# Patient Record
Sex: FEMALE | Race: WHITE | Employment: OTHER | URBAN - METROPOLITAN AREA
[De-identification: names, ages, dates, MRNs, and addresses within clinical notes are randomized per-mention and may not be internally consistent; named-entity substitution may affect disease eponyms.]

---

## 2024-10-18 ENCOUNTER — OFFICE VISIT (OUTPATIENT)
Dept: CARDIOLOGY CLINIC | Age: 74
End: 2024-10-18

## 2024-10-18 VITALS
DIASTOLIC BLOOD PRESSURE: 82 MMHG | HEIGHT: 66 IN | SYSTOLIC BLOOD PRESSURE: 134 MMHG | WEIGHT: 158 LBS | OXYGEN SATURATION: 98 % | HEART RATE: 76 BPM | BODY MASS INDEX: 25.39 KG/M2

## 2024-10-18 DIAGNOSIS — I10 ESSENTIAL HYPERTENSION: ICD-10-CM

## 2024-10-18 DIAGNOSIS — I48.0 PAF (PAROXYSMAL ATRIAL FIBRILLATION) (HCC): Primary | ICD-10-CM

## 2024-10-18 NOTE — PROGRESS NOTES
Electrophysiology FU Note        Chief complaint :  here for discuss about watchman and anemia, anticoagulation.     Referring physician:       Primary care physician: Monica Schmidt      History of Present Illness:     This visit (10/18/2024)    Patient here to discuss about watchman  Patient is back on xarelto  She is taking her medications regularly  Patient worried about anticoagulation and bleeding risk but at the same time worried about procedure risk  Patient feels tiredness  Patient denies syncope or dizziness. Denies shortness of breath        Previous visit:  (8/23/2024)    Patient here to discuss about watchman  Patient reports that she had anemia severe recently and she is getting work up from GI  She is not taking her xarelto any more and has appt with  on Monday  Here to discuss options for her anticoagulation  Denies chest pain, shortness of breath, palpitations, syncope or presyncope, edema,  Feels tired though    Previous visit:  (5/5/2023)      Chief Complaint   Patient presents with    6 Month Follow-Up     Pt here for 6 month f/u. Pt denies Chest Pains, Palpitations, SOB, Dizziness and Edema. Patient does not smoke or drink alcohol. Pt drinks 1 cup of coffee in the mornings.    She has lot of stress going on - its from her .   She has bright blood in stool at times but not now  She does not want colonoscopy and has plan for seeing gastroenterologist.         Previous visit: (10/21/2022)      Chief Complaint   Patient presents with    Hyperlipidemia    Hypertension     Denies any new onset cardiac complaints     Patient feels better than before.  But still having a lot of stress from her .  Patient recently had her tooth filled and she had some swelling around the tooth area      Previous visit: (3/16/2022)      Chief Complaint   Patient presents with    6 Month Follow-Up     Dr. Sue Patient here for 6 month f/u,

## 2024-11-06 ENCOUNTER — HOSPITAL ENCOUNTER (OUTPATIENT)
Dept: INFUSION THERAPY | Age: 74
Discharge: HOME | End: 2024-11-06
Payer: COMMERCIAL

## 2024-11-06 ENCOUNTER — OFFICE VISIT (OUTPATIENT)
Dept: ONCOLOGY | Age: 74
End: 2024-11-06
Payer: COMMERCIAL

## 2024-11-06 VITALS
BODY MASS INDEX: 24.75 KG/M2 | HEIGHT: 66 IN | WEIGHT: 154 LBS | OXYGEN SATURATION: 96 % | TEMPERATURE: 98 F | SYSTOLIC BLOOD PRESSURE: 136 MMHG | DIASTOLIC BLOOD PRESSURE: 62 MMHG | HEART RATE: 67 BPM

## 2024-11-06 DIAGNOSIS — K90.9 INTESTINAL MALABSORPTION, UNSPECIFIED TYPE: ICD-10-CM

## 2024-11-06 DIAGNOSIS — D50.0 IRON DEFICIENCY ANEMIA SECONDARY TO BLOOD LOSS (CHRONIC): Primary | ICD-10-CM

## 2024-11-06 PROCEDURE — 1159F MED LIST DOCD IN RCRD: CPT | Performed by: INTERNAL MEDICINE

## 2024-11-06 PROCEDURE — 99211 OFF/OP EST MAY X REQ PHY/QHP: CPT

## 2024-11-06 PROCEDURE — 1126F AMNT PAIN NOTED NONE PRSNT: CPT | Performed by: INTERNAL MEDICINE

## 2024-11-06 PROCEDURE — 3078F DIAST BP <80 MM HG: CPT | Performed by: INTERNAL MEDICINE

## 2024-11-06 PROCEDURE — 99214 OFFICE O/P EST MOD 30 MIN: CPT | Performed by: INTERNAL MEDICINE

## 2024-11-06 PROCEDURE — 1123F ACP DISCUSS/DSCN MKR DOCD: CPT | Performed by: INTERNAL MEDICINE

## 2024-11-06 PROCEDURE — 3075F SYST BP GE 130 - 139MM HG: CPT | Performed by: INTERNAL MEDICINE

## 2024-11-06 ASSESSMENT — PATIENT HEALTH QUESTIONNAIRE - PHQ9
SUM OF ALL RESPONSES TO PHQ QUESTIONS 1-9: 0
1. LITTLE INTEREST OR PLEASURE IN DOING THINGS: NOT AT ALL
2. FEELING DOWN, DEPRESSED OR HOPELESS: NOT AT ALL
SUM OF ALL RESPONSES TO PHQ9 QUESTIONS 1 & 2: 0

## 2024-11-06 NOTE — PROGRESS NOTES
MA Rooming Questions  Patient: Melodie Doll  MRN: [id number]    Date: 11/6/2024        1. Do you have any new issues?   no         2. Do you need any refills on medications?    no    3. Have you had any imaging done since your last visit?   yes - labs 10/9    4. Have you been hospitalized or seen in the emergency room since your last visit here?   yes - 8/26-8/28    5. Did the patient have a depression screening completed today? Yes    PHQ-9 Total Score: 0 (11/6/2024  1:07 PM)       PHQ-9 Given to (if applicable):               PHQ-9 Score (if applicable):                     [] Positive     []  Negative              Does question #9 need addressed (if applicable)                     [] Yes    []  No               Shruthi Cramer CMA      
headache, No hiccups, No abnormal gait, No sensory changes, No weakness.  Hematologic:  No epistaxis, No gingival bleeding, No petechiae, No ecchymosis.  Lymphatic:  No lymphadenopathy, No lymphedema.  Allergy / Immunologic:  No eczema, No frequent mucous infections, No frequent respiratory infections, No recurrent urticarial, No frequent skin infections.     Vital Signs: /62 (Site: Left Upper Arm, Position: Sitting, Cuff Size: Medium Adult)   Pulse 67   Temp 98 °F (36.7 °C) (Temporal)   Ht 1.676 m (5' 6\")   Wt 69.9 kg (154 lb)   LMP  (LMP Unknown)   SpO2 96%   BMI 24.86 kg/m²      CONSTITUTIONAL: awake, alert, cooperative, no apparent distress , ambulating with cane   EYES: CATHERINE pallor but no icterus  ENT: ATNC  NECK: No JVD  HEMATOLOGIC/LYMPHATIC: no cervical, supraclavicular or axillary lymphadenopathy   LUNGS: CTAB  CARDIOVASCULAR: s1s2 rrr no murmurs  ABDOMEN: soft ntnd bs pos  MUSCULOSKELETAL: full range of motion noted, tone is normal  NEUROLOGIC: GI  SKIN: No rash  EXTREMITIES: no LE edema bilaterally     Labs:  Hematology:  Lab Results   Component Value Date    WBC 5.6 08/27/2024    RBC 3.23 (L) 08/27/2024    HGB 9.7 (L) 08/27/2024    HCT 31.1 (L) 08/27/2024    MCV 96.3 08/27/2024    MCH 30.0 08/27/2024    MCHC 31.2 (L) 08/27/2024    RDW 15.6 (H) 08/27/2024     08/27/2024    MPV 9.3 08/27/2024    BASOPCT 0.5 08/27/2024    LYMPHOPCT 33.5 08/27/2024    MONOPCT 11.6 (H) 08/27/2024    EOSABS 0.2 08/27/2024    BASOSABS 0.0 08/27/2024    LYMPHSABS 1.9 08/27/2024    MONOSABS 0.7 08/27/2024    DIFFTYPE AUTOMATED DIFFERENTIAL 08/27/2024     No results found for: \"ESR\"  Chemistry:  Lab Results   Component Value Date     08/27/2024    K 4.2 08/27/2024     08/27/2024    CO2 26 08/27/2024    BUN 15 08/27/2024    CREATININE 0.7 08/27/2024    GLUCOSE 140 (H) 08/27/2024    CALCIUM 8.9 08/27/2024    BILITOT 0.2 08/26/2024    ALKPHOS 48 08/26/2024    AST 34 08/26/2024    ALT 24 08/26/2024

## 2024-12-04 ENCOUNTER — TRANSCRIBE ORDERS (OUTPATIENT)
Dept: ADMINISTRATIVE | Age: 74
End: 2024-12-04

## 2024-12-04 DIAGNOSIS — R40.4 ALTERATION OF CONSCIOUSNESS: Primary | ICD-10-CM

## 2024-12-06 ENCOUNTER — TRANSCRIBE ORDERS (OUTPATIENT)
Dept: ADMINISTRATIVE | Age: 74
End: 2024-12-06

## 2024-12-06 DIAGNOSIS — K76.0 FATTY LIVER: Primary | ICD-10-CM

## 2024-12-12 ENCOUNTER — APPOINTMENT (OUTPATIENT)
Dept: MRI IMAGING | Age: 74
End: 2024-12-12
Attending: FAMILY MEDICINE
Payer: COMMERCIAL

## 2024-12-12 ENCOUNTER — HOSPITAL ENCOUNTER (OUTPATIENT)
Dept: MRI IMAGING | Age: 74
Discharge: HOME | End: 2024-12-12
Attending: FAMILY MEDICINE
Payer: COMMERCIAL

## 2024-12-12 DIAGNOSIS — R40.4 ALTERATION OF CONSCIOUSNESS: ICD-10-CM

## 2024-12-12 PROCEDURE — 70551 MRI BRAIN STEM W/O DYE: CPT

## 2024-12-16 ENCOUNTER — HOSPITAL ENCOUNTER (OUTPATIENT)
Dept: ULTRASOUND IMAGING | Age: 74
Discharge: HOME | End: 2024-12-16
Attending: FAMILY MEDICINE
Payer: COMMERCIAL

## 2024-12-16 ENCOUNTER — TELEPHONE (OUTPATIENT)
Dept: CARDIOLOGY CLINIC | Age: 74
End: 2024-12-16

## 2024-12-16 DIAGNOSIS — K76.0 FATTY LIVER: ICD-10-CM

## 2024-12-16 PROCEDURE — 76705 ECHO EXAM OF ABDOMEN: CPT

## 2024-12-16 NOTE — TELEPHONE ENCOUNTER
Pt came to ask about med refill placed on 12/13 and that she only has a week of meds left and would like to be contacted when order is placed.

## 2025-02-24 ENCOUNTER — TELEPHONE (OUTPATIENT)
Dept: CARDIOLOGY CLINIC | Age: 75
End: 2025-02-24

## 2025-02-24 NOTE — TELEPHONE ENCOUNTER
Pt said her insurance is saying they don't cover xarelto and she doesn't know what to do. It is to expensive for them to cover

## 2025-02-26 NOTE — TELEPHONE ENCOUNTER
Patient called the cost has gone  up to $409   90 day for her Xarelto she only has a few days   Left and needs a call back. Second call .

## 2025-02-27 NOTE — PROGRESS NOTES
Patient at  stating she can not afford her Xarelto and wanted to know if she could go on Eliquis to get samples. Per Delmy lin to switch, Eliquis RX called into pharmacy and samples given.

## 2025-03-03 ENCOUNTER — HOSPITAL ENCOUNTER (OUTPATIENT)
Dept: INFUSION THERAPY | Age: 75
Discharge: HOME | End: 2025-03-03
Payer: COMMERCIAL

## 2025-03-03 ENCOUNTER — OFFICE VISIT (OUTPATIENT)
Dept: ONCOLOGY | Age: 75
End: 2025-03-03
Payer: COMMERCIAL

## 2025-03-03 VITALS
OXYGEN SATURATION: 99 % | HEIGHT: 66 IN | SYSTOLIC BLOOD PRESSURE: 156 MMHG | WEIGHT: 154.2 LBS | DIASTOLIC BLOOD PRESSURE: 96 MMHG | BODY MASS INDEX: 24.78 KG/M2 | TEMPERATURE: 98.6 F | HEART RATE: 66 BPM

## 2025-03-03 DIAGNOSIS — D50.0 IRON DEFICIENCY ANEMIA SECONDARY TO BLOOD LOSS (CHRONIC): Primary | ICD-10-CM

## 2025-03-03 PROCEDURE — 3080F DIAST BP >= 90 MM HG: CPT | Performed by: INTERNAL MEDICINE

## 2025-03-03 PROCEDURE — 99213 OFFICE O/P EST LOW 20 MIN: CPT | Performed by: INTERNAL MEDICINE

## 2025-03-03 PROCEDURE — 1123F ACP DISCUSS/DSCN MKR DOCD: CPT | Performed by: INTERNAL MEDICINE

## 2025-03-03 PROCEDURE — 1159F MED LIST DOCD IN RCRD: CPT | Performed by: INTERNAL MEDICINE

## 2025-03-03 PROCEDURE — 3077F SYST BP >= 140 MM HG: CPT | Performed by: INTERNAL MEDICINE

## 2025-03-03 PROCEDURE — 99212 OFFICE O/P EST SF 10 MIN: CPT

## 2025-03-03 PROCEDURE — 1126F AMNT PAIN NOTED NONE PRSNT: CPT | Performed by: INTERNAL MEDICINE

## 2025-03-03 NOTE — PROGRESS NOTES
MA Rooming Questions  Patient: Melodie Madden  MRN: [id number]    Date: 3/3/2025        1. Do you have any new issues?   no         2. Do you need any refills on medications?    no    3. Have you had any imaging done since your last visit?   yes - [facility] abdomen, mri brain with [facility]    4. Have you been hospitalized or seen in the emergency room since your last visit here?   no    5. Did the patient have a depression screening completed today? No    No data recorded     PHQ-9 Given to (if applicable):               PHQ-9 Score (if applicable):                     [] Positive     []  Negative              Does question #9 need addressed (if applicable)                     [] Yes    []  No               Billie Moffett MA      
follow-up with primary care physician and other specialists.    Please do not hesitate to contact us if you need further information.    Return to clinic 6m  or earlier if new Sx    This note is created with the assistance of a speech-recognition program. While intending to generate a document that accurately reflects the content of the visit, no guarantee can be provided that every mistake has been identified and corrected by editing.    Portions of this note are copied forward from previous clinic note.  Interval history, ROS, physical exam, assessment and plan has been reviewed and updated for accuracy by this provider.    Time Spent with patient,  for face to face, exam, education, discussing treatment options, reviewing imaging, reviewing labs, decision making, Pre-charting, counseling and documenting today's visit, 25   mins.     REZA CONNOR, Deanna Uriostegui, am scribing for and in the presence of Wendi Tena MD. 3/3/25/4:00 AM EST

## 2025-03-17 ENCOUNTER — TELEPHONE (OUTPATIENT)
Dept: CARDIOLOGY CLINIC | Age: 75
End: 2025-03-17

## 2025-03-17 NOTE — TELEPHONE ENCOUNTER
Spoke with Patient and she stated her insurance and no longer covering Xarelto and would like to switch to Eliquis. I scheduled office visit with Delmy Campbell NP on 4/8/25 @ 1045am.    Patient advised understanding.

## 2025-03-24 ENCOUNTER — TELEPHONE (OUTPATIENT)
Dept: CARDIOLOGY CLINIC | Age: 75
End: 2025-03-24

## 2025-03-24 ENCOUNTER — OFFICE VISIT (OUTPATIENT)
Dept: CARDIOLOGY CLINIC | Age: 75
End: 2025-03-24
Payer: COMMERCIAL

## 2025-03-24 DIAGNOSIS — I63.423 CEREBROVASCULAR ACCIDENT (CVA) DUE TO BILATERAL OCCLUSION OF ANTERIOR CEREBRAL ARTERIES (HCC): ICD-10-CM

## 2025-03-24 DIAGNOSIS — I10 ESSENTIAL HYPERTENSION: ICD-10-CM

## 2025-03-24 DIAGNOSIS — D68.69 HYPERCOAGULABLE STATE DUE TO PAROXYSMAL ATRIAL FIBRILLATION (HCC): ICD-10-CM

## 2025-03-24 DIAGNOSIS — I48.0 PAF (PAROXYSMAL ATRIAL FIBRILLATION) (HCC): ICD-10-CM

## 2025-03-24 DIAGNOSIS — R42 DIZZINESS: Primary | ICD-10-CM

## 2025-03-24 DIAGNOSIS — R01.1 MURMUR: ICD-10-CM

## 2025-03-24 DIAGNOSIS — I48.0 HYPERCOAGULABLE STATE DUE TO PAROXYSMAL ATRIAL FIBRILLATION (HCC): ICD-10-CM

## 2025-03-24 PROCEDURE — 99204 OFFICE O/P NEW MOD 45 MIN: CPT | Performed by: INTERNAL MEDICINE

## 2025-03-24 PROCEDURE — 93000 ELECTROCARDIOGRAM COMPLETE: CPT | Performed by: INTERNAL MEDICINE

## 2025-03-24 NOTE — TELEPHONE ENCOUNTER
Patient came to office because she was lightheaded and felt like she was going to pass out.  Patient was brought back into room. Dr Yeung agreed to see patient

## 2025-03-24 NOTE — PROGRESS NOTES
CARDIOLOGY CONSULT NOTE   Reason for consultation:  DIZZINESS    Referring physician:  No admitting provider for patient encounter.     Primary care physician: Monica Schmidt      Dear   Thanks for the consult.    History of present illness:Melodie is a 74 y.o.year old who  presents with Patient feels dizziness AND almost fainting when she stood up at the front counter to get samples of eliquis ( switching from xarelto) its intermittent,lasts for few seconds, for last 2-3 weeks, every other day, its associated with palpitations,it gets better with sitting with sudden movement,pateint is not sure if its from head movement or arm movement  She had iron infusion for low hemoglobin count, she may get watchman devie  Blood pressure, cholesterol, blood glucose and weight are well controlled.    Past medical history:    has a past medical history of Cerebral artery occlusion with cerebral infarction (HCC), Diabetes mellitus (HCC), History of transesophageal echocardiography (LARY), Hyperlipidemia, and Hypertension.  Past surgical history:   has a past surgical history that includes Anterior cruciate ligament repair; Eye surgery; Appendectomy; Cholecystectomy; knee surgery; Ovary removal; and Dilation and curettage of uterus.  Social History:   reports that she quit smoking about 14 years ago. Her smoking use included cigarettes. She has never used smokeless tobacco.  Family history:   no family history of CAD, STROKE of DM    Allergies   Allergen Reactions    Amlodipine      Upset stomach    Aspirin     Meperidine      hives    Statins      Patient reports that she had a couple statins and they caused swelling in her tongue.  She ended up in the emergency department, but was not admitted.  She was given steroids on one occasion and Benadryl on another occasion.    Sulfa Antibiotics     Tetanus Immune Globulin     Gadolinium Derivatives Hives and Itching     Pt had hive on neck and back and itching stomach neck and back

## 2025-03-26 NOTE — TELEPHONE ENCOUNTER
Pt stating that [facility] said the Elquis $140 instead of 30day trial. Would like to talk to someone about it.

## 2025-03-31 ENCOUNTER — TELEPHONE (OUTPATIENT)
Dept: CARDIOLOGY CLINIC | Age: 75
End: 2025-03-31

## 2025-03-31 ENCOUNTER — TELEPHONE (OUTPATIENT)
Dept: ONCOLOGY | Age: 75
End: 2025-03-31

## 2025-03-31 NOTE — TELEPHONE ENCOUNTER
Pt called in and was very confused about appointments. She states something \"short circuited\" in her brain in the past couple of days and she cannot remember anything. She was inquiring about her \"future\" appt on 3/3/25. I informed her that 3/3/25 was a past date and she already had this appt. I asked her if she knew what the date was today - she did not and said she would look at her phone to see if she could see the date. Upon looking at her phone she said that she cannot understand what the numbers mean and that it is all very confusing. I informed her of todays date and she even questioned that 2025 was not the correct year and it had to be wrong. I strongly urged her to go to the ER and she states that she is \"too old to worry about this stuff and I will talk to my family doctor, they listen to me\". I urged her again to go to the ER since she has suddenly felt that very confused and cannot remember anything. She still refused. After I got off the phone with her I contacted her son, Ric, who is in her HIPAA communications and asked if he had spoke with her recently which he said he has not. I informed him of the interaction with his mother and that it would be best that she go to the ER to be evaluated because it is concerning when a patient suddenly loses their memory. He states that he will go and check on her.

## 2025-03-31 NOTE — TELEPHONE ENCOUNTER
patient wanted to cancel refused to r/s she is not able to drive to [facility]   FYI

## 2025-03-31 NOTE — TELEPHONE ENCOUNTER
I called and spoke with patient's son to see if he had checked on her. He said he called and spoke with her this morning for over an hour. He said that the patient told him that she is doing fine and that she drove yesterday and is planning on driving today so she seems okay. She told her son that she does not need anything so he decided not to go and check on her. She is having trouble with dates but that does not seem out of the norm for her.

## 2025-03-31 NOTE — TELEPHONE ENCOUNTER
Spoke with Meri Gambino MA in our office today about Patients testing/follow up appointments that were cancelled this morning per Patients request.    Meri advised me to wait until Patient is in office next week to make the determination on whether the Patient should reschedule testing/follow up appointments.    Patient may have a better understanding of things in person as opposed to over the phone conversations.

## 2025-03-31 NOTE — TELEPHONE ENCOUNTER
Spoke with Patient and she stated she has all of these appointments but numbers aren't making any sense to her. I tried explaining to Melodie that her appointment with Delmy Campbell NP next week 4/8/25 is to discuss her medications and we will call to remind her of her appointment when it gets closer to the date/time. Patient stated she gets around her home fine, and has been driving, but can't make any sense from numbers. Patient said she knows it is almost April, but any certain dates or times in her calendar confuses her. I tried my best to simplify our conversation.    Patient stated she has been getting samples of Xarelto and would like to stay on this medication if she can continue getting supplied samples. I informed Patient that our office gives out a limited number of samples to Patients, if we have the samples in office to give.    Patient advised understanding and kept appointment scheduled for 4/8/25 with Delmy Campbell NP

## 2025-04-08 ENCOUNTER — OFFICE VISIT (OUTPATIENT)
Age: 75
End: 2025-04-08
Payer: COMMERCIAL

## 2025-04-08 VITALS
SYSTOLIC BLOOD PRESSURE: 134 MMHG | DIASTOLIC BLOOD PRESSURE: 78 MMHG | HEART RATE: 73 BPM | WEIGHT: 151 LBS | BODY MASS INDEX: 24.27 KG/M2 | HEIGHT: 66 IN

## 2025-04-08 DIAGNOSIS — D68.69 HYPERCOAGULABLE STATE DUE TO PAROXYSMAL ATRIAL FIBRILLATION (HCC): ICD-10-CM

## 2025-04-08 DIAGNOSIS — I48.0 PAF (PAROXYSMAL ATRIAL FIBRILLATION) (HCC): Primary | ICD-10-CM

## 2025-04-08 DIAGNOSIS — I10 ESSENTIAL HYPERTENSION: ICD-10-CM

## 2025-04-08 DIAGNOSIS — I48.0 HYPERCOAGULABLE STATE DUE TO PAROXYSMAL ATRIAL FIBRILLATION (HCC): ICD-10-CM

## 2025-04-08 PROCEDURE — 3075F SYST BP GE 130 - 139MM HG: CPT | Performed by: NURSE PRACTITIONER

## 2025-04-08 PROCEDURE — 1123F ACP DISCUSS/DSCN MKR DOCD: CPT | Performed by: NURSE PRACTITIONER

## 2025-04-08 PROCEDURE — 99214 OFFICE O/P EST MOD 30 MIN: CPT | Performed by: NURSE PRACTITIONER

## 2025-04-08 PROCEDURE — 1159F MED LIST DOCD IN RCRD: CPT | Performed by: NURSE PRACTITIONER

## 2025-04-08 PROCEDURE — 3078F DIAST BP <80 MM HG: CPT | Performed by: NURSE PRACTITIONER

## 2025-04-08 RX ORDER — CLOPIDOGREL BISULFATE 75 MG/1
75 TABLET ORAL DAILY
Qty: 90 TABLET | Refills: 0 | Status: SHIPPED | OUTPATIENT
Start: 2025-04-08

## 2025-04-08 ASSESSMENT — ENCOUNTER SYMPTOMS
COUGH: 0
PHOTOPHOBIA: 0
ABDOMINAL PAIN: 0
BACK PAIN: 0
ANAL BLEEDING: 0
BLOOD IN STOOL: 0
SINUS PAIN: 0
ABDOMINAL DISTENTION: 0
SINUS PRESSURE: 0
COLOR CHANGE: 0
SHORTNESS OF BREATH: 0

## 2025-04-08 NOTE — PROGRESS NOTES
Electrophysiology FU Note        Chief complaint: follow up on atrial fibrillation    Referring physician:       Primary care physician: Monica Schmidt      History of Present Illness:     This visit 4/8/2025  Patient here today for follow-up on anticoagulation.  Patient reports that she cannot tolerate Eliquis or Xarelto.  She states that she cannot tolerate aspirin either.  She has been on Plavix before.  She reports that she does not want to have any procedures.  We did discuss watchman in the past but she does not want to have watchman.  Patient denies chest pain, palpitations, shortness of breath, lightheadedness,  edema or syncope.  Patient does have episodes of dizziness.  She is following with Dr. Yeung.  Patient complains of transient amnesia.  She did have an MRI in December with her PCP which showed no acute abnormalities.  Patient does endorse she had a stroke several years ago.    Previous visit 2/20/2024  Patient is here today for follow up on atrial fibrillation. She reports she is feeling well. She states she has not had atrial fibrillation. She denies chest pain,  palpitations, shortness of breath, edema, dizziness, or syncope.  She follows with her PCP regularly. She only wants to see EP.     Previous visit (5/5/2023)      Chief Complaint   Patient presents with    6 Month Follow-Up     Pt here for 6 month f/u. Pt denies Chest Pains, Palpitations, SOB, Dizziness and Edema. Patient does not smoke or drink alcohol. Pt drinks 1 cup of coffee in the mornings.    She has lot of stress going on - its from her .   She has bright blood in stool at times but not now  She does not want colonoscopy and has plan for seeing gastroenterologist.         Previous visit: (10/21/2022)      Chief Complaint   Patient presents with    Hyperlipidemia    Hypertension     Denies any new onset cardiac complaints     Patient feels better than before.  But still

## 2025-04-08 NOTE — PATIENT INSTRUCTIONS
Thank you for allowing us to care for you today!   We want to ensure we can follow your treatment plan and we strive to give you the best outcomes and experience possible.   If you ever have a life threatening emergency and call 911 - for an ambulance (EMS)  REMEMBER  Our providers can only care for you at:   [facility] or [facility]   Even if you have someone take you or you drive yourself we can only care for you in a [facility] facility. Our providers are not setup at the other healthcare locations!    PLEASE CALL OUR OFFICE DURING NORMAL BUSINESS HOURS  Monday through Friday 8 am to 5 pm  AFTER HOURS the physician on-call cannot help with scheduling, rescheduling, procedure instruction questions or any type of medication need or issue.  [facility] P:[phone] - [facility] P:[phone] - [facility] P:[phone]      If you receive a survey:  We would appreciate you taking the time to share your experience concerning your provider visit in the office.    These surveys are confidential!  We are eager to improve and are counting on you to share your feedback so we can ensure you get the best care possible.

## 2025-04-16 ENCOUNTER — TELEPHONE (OUTPATIENT)
Dept: CARDIOLOGY CLINIC | Age: 75
End: 2025-04-16

## 2025-04-16 NOTE — TELEPHONE ENCOUNTER
I reached out to son Ric to inform of pt confusion and driving self to office twice this week.  Son expressed thanks for letting him know

## 2025-04-28 ENCOUNTER — APPOINTMENT (OUTPATIENT)
Dept: GENERAL RADIOLOGY | Age: 75
DRG: 948 | End: 2025-04-28
Payer: COMMERCIAL

## 2025-04-28 ENCOUNTER — HOSPITAL ENCOUNTER (INPATIENT)
Age: 75
LOS: 1 days | Discharge: HOME HEALTH | DRG: 948 | End: 2025-04-29
Attending: STUDENT IN AN ORGANIZED HEALTH CARE EDUCATION/TRAINING PROGRAM | Admitting: STUDENT IN AN ORGANIZED HEALTH CARE EDUCATION/TRAINING PROGRAM
Payer: COMMERCIAL

## 2025-04-28 ENCOUNTER — APPOINTMENT (OUTPATIENT)
Dept: CT IMAGING | Age: 75
DRG: 948 | End: 2025-04-28
Payer: COMMERCIAL

## 2025-04-28 DIAGNOSIS — R79.89 ELEVATED TROPONIN: ICD-10-CM

## 2025-04-28 DIAGNOSIS — R41.0 EPISODIC CONFUSION: Primary | ICD-10-CM

## 2025-04-28 PROBLEM — R46.89 COGNITIVE AND BEHAVIORAL CHANGES: Status: ACTIVE | Noted: 2025-04-28

## 2025-04-28 PROBLEM — R41.89 COGNITIVE AND BEHAVIORAL CHANGES: Status: ACTIVE | Noted: 2025-04-28

## 2025-04-28 LAB
ALBUMIN SERPL-MCNC: 4.2 G/DL (ref 3.4–5)
ALBUMIN/GLOB SERPL: 1.7 {RATIO} (ref 1.1–2.2)
ALP SERPL-CCNC: 43 U/L (ref 40–129)
ALT SERPL-CCNC: 29 U/L (ref 10–40)
AMMONIA PLAS-SCNC: 38 UMOL/L (ref 11–51)
ANION GAP SERPL CALCULATED.3IONS-SCNC: 13 MMOL/L (ref 9–17)
APAP SERPL-MCNC: <5 UG/ML (ref 10–30)
ARTERIAL PATENCY WRIST A: ABNORMAL
AST SERPL-CCNC: 35 U/L (ref 15–37)
BASOPHILS # BLD: 0.02 K/UL
BASOPHILS NFR BLD: 0 % (ref 0–1)
BILIRUB SERPL-MCNC: 0.3 MG/DL (ref 0–1)
BILIRUB UR QL STRIP: NEGATIVE
BODY TEMPERATURE: 37
BUN SERPL-MCNC: 27 MG/DL (ref 7–20)
CALCIUM SERPL-MCNC: 9.9 MG/DL (ref 8.3–10.6)
CHARACTER UR: ABNORMAL
CHLORIDE SERPL-SCNC: 104 MMOL/L (ref 99–110)
CK SERPL-CCNC: 363 U/L (ref 26–192)
CLARITY UR: CLEAR
CO2 SERPL-SCNC: 25 MMOL/L (ref 21–32)
COHGB MFR BLD: 0.5 % (ref 0.5–1.5)
COLOR UR: YELLOW
CREAT SERPL-MCNC: 1 MG/DL (ref 0.6–1.2)
EOSINOPHIL # BLD: 0.08 K/UL
EOSINOPHILS RELATIVE PERCENT: 1 % (ref 0–3)
EPI CELLS #/AREA URNS HPF: 1 /HPF
ERYTHROCYTE [DISTWIDTH] IN BLOOD BY AUTOMATED COUNT: 12.5 % (ref 11.7–14.9)
ETHANOLAMINE SERPL-MCNC: <10 MG/DL (ref 0–0.08)
GFR, ESTIMATED: 52 ML/MIN/1.73M2
GLUCOSE SERPL-MCNC: 168 MG/DL (ref 74–99)
GLUCOSE UR STRIP-MCNC: NEGATIVE MG/DL
HCO3 VENOUS: 24.9 MMOL/L (ref 22–29)
HCT VFR BLD AUTO: 40.6 % (ref 37–47)
HGB BLD-MCNC: 14.2 G/DL (ref 12.5–16)
HGB UR QL STRIP.AUTO: NEGATIVE
IMM GRANULOCYTES # BLD AUTO: 0.02 K/UL
IMM GRANULOCYTES NFR BLD: 0 %
INR PPP: 0.9
KETONES UR STRIP-MCNC: NEGATIVE MG/DL
LEUKOCYTE ESTERASE UR QL STRIP: ABNORMAL
LIPASE SERPL-CCNC: 18 U/L (ref 13–60)
LYMPHOCYTES NFR BLD: 1.42 K/UL
LYMPHOCYTES RELATIVE PERCENT: 20 % (ref 24–44)
MAGNESIUM SERPL-MCNC: 2.4 MG/DL (ref 1.8–2.4)
MCH RBC QN AUTO: 31.6 PG (ref 27–31)
MCHC RBC AUTO-ENTMCNC: 35 G/DL (ref 32–36)
MCV RBC AUTO: 90.4 FL (ref 78–100)
METHEMOGLOBIN: 0.4 % (ref 0.5–1.5)
MONOCYTES NFR BLD: 0.52 K/UL
MONOCYTES NFR BLD: 7 % (ref 0–5)
MUCOUS THREADS URNS QL MICRO: ABNORMAL
NEUTROPHILS NFR BLD: 71 % (ref 36–66)
NEUTS SEG NFR BLD: 5.01 K/UL
NITRITE UR QL STRIP: NEGATIVE
OXYHGB MFR BLD: 80.1 %
PCO2 VENOUS: 37.6 MM HG (ref 38–54)
PH UR STRIP: 7 [PH] (ref 5–8)
PH VENOUS: 7.44 (ref 7.32–7.43)
PLATELET # BLD AUTO: 217 K/UL (ref 140–440)
PMV BLD AUTO: 9.4 FL (ref 7.5–11.1)
PO2 VENOUS: 43.1 MM HG (ref 23–48)
POSITIVE BASE EXCESS, VEN: 0.9 MMOL/L (ref 0–3)
POTASSIUM SERPL-SCNC: 3.9 MMOL/L (ref 3.5–5.1)
PROT SERPL-MCNC: 6.7 G/DL (ref 6.4–8.2)
PROT UR STRIP-MCNC: NEGATIVE MG/DL
PROTHROMBIN TIME: 13 SEC (ref 11.7–14.5)
RBC # BLD AUTO: 4.49 M/UL (ref 4.2–5.4)
RBC #/AREA URNS HPF: 1 /HPF (ref 0–2)
SALICYLATES SERPL-MCNC: <0.5 MG/DL (ref 15–30)
SODIUM SERPL-SCNC: 141 MMOL/L (ref 136–145)
SP GR UR STRIP: 1.01 (ref 1–1.03)
TROPONIN I SERPL HS-MCNC: 29 NG/L (ref 0–14)
TROPONIN I SERPL HS-MCNC: 29 NG/L (ref 0–14)
TSH SERPL DL<=0.05 MIU/L-ACNC: 0.38 UIU/ML (ref 0.27–4.2)
UROBILINOGEN UR STRIP-ACNC: 0.2 EU/DL (ref 0–1)
WBC #/AREA URNS HPF: 5 /HPF (ref 0–5)
WBC OTHER # BLD: 7.1 K/UL (ref 4–10.5)

## 2025-04-28 PROCEDURE — 84484 ASSAY OF TROPONIN QUANT: CPT

## 2025-04-28 PROCEDURE — 83735 ASSAY OF MAGNESIUM: CPT

## 2025-04-28 PROCEDURE — 96372 THER/PROPH/DIAG INJ SC/IM: CPT

## 2025-04-28 PROCEDURE — G0378 HOSPITAL OBSERVATION PER HR: HCPCS

## 2025-04-28 PROCEDURE — 2500000003 HC RX 250 WO HCPCS: Performed by: STUDENT IN AN ORGANIZED HEALTH CARE EDUCATION/TRAINING PROGRAM

## 2025-04-28 PROCEDURE — 83690 ASSAY OF LIPASE: CPT

## 2025-04-28 PROCEDURE — 6360000002 HC RX W HCPCS: Performed by: STUDENT IN AN ORGANIZED HEALTH CARE EDUCATION/TRAINING PROGRAM

## 2025-04-28 PROCEDURE — 1200000000 HC SEMI PRIVATE

## 2025-04-28 PROCEDURE — 80143 DRUG ASSAY ACETAMINOPHEN: CPT

## 2025-04-28 PROCEDURE — 82805 BLOOD GASES W/O2 SATURATION: CPT

## 2025-04-28 PROCEDURE — 80179 DRUG ASSAY SALICYLATE: CPT

## 2025-04-28 PROCEDURE — 70450 CT HEAD/BRAIN W/O DYE: CPT

## 2025-04-28 PROCEDURE — 85025 COMPLETE CBC W/AUTO DIFF WBC: CPT

## 2025-04-28 PROCEDURE — 82550 ASSAY OF CK (CPK): CPT

## 2025-04-28 PROCEDURE — 84443 ASSAY THYROID STIM HORMONE: CPT

## 2025-04-28 PROCEDURE — 81001 URINALYSIS AUTO W/SCOPE: CPT

## 2025-04-28 PROCEDURE — 85610 PROTHROMBIN TIME: CPT

## 2025-04-28 PROCEDURE — 6370000000 HC RX 637 (ALT 250 FOR IP): Performed by: STUDENT IN AN ORGANIZED HEALTH CARE EDUCATION/TRAINING PROGRAM

## 2025-04-28 PROCEDURE — G0480 DRUG TEST DEF 1-7 CLASSES: HCPCS

## 2025-04-28 PROCEDURE — 93005 ELECTROCARDIOGRAM TRACING: CPT | Performed by: STUDENT IN AN ORGANIZED HEALTH CARE EDUCATION/TRAINING PROGRAM

## 2025-04-28 PROCEDURE — 80053 COMPREHEN METABOLIC PANEL: CPT

## 2025-04-28 PROCEDURE — 36415 COLL VENOUS BLD VENIPUNCTURE: CPT

## 2025-04-28 PROCEDURE — 99285 EMERGENCY DEPT VISIT HI MDM: CPT

## 2025-04-28 PROCEDURE — 82140 ASSAY OF AMMONIA: CPT

## 2025-04-28 PROCEDURE — 71045 X-RAY EXAM CHEST 1 VIEW: CPT

## 2025-04-28 RX ORDER — POTASSIUM CHLORIDE 1500 MG/1
40 TABLET, EXTENDED RELEASE ORAL PRN
Status: DISCONTINUED | OUTPATIENT
Start: 2025-04-28 | End: 2025-04-29 | Stop reason: HOSPADM

## 2025-04-28 RX ORDER — SODIUM CHLORIDE 0.9 % (FLUSH) 0.9 %
5-40 SYRINGE (ML) INJECTION EVERY 12 HOURS SCHEDULED
Status: DISCONTINUED | OUTPATIENT
Start: 2025-04-28 | End: 2025-04-29 | Stop reason: HOSPADM

## 2025-04-28 RX ORDER — ACETAMINOPHEN 650 MG/1
650 SUPPOSITORY RECTAL EVERY 6 HOURS PRN
Status: DISCONTINUED | OUTPATIENT
Start: 2025-04-28 | End: 2025-04-29 | Stop reason: HOSPADM

## 2025-04-28 RX ORDER — ENOXAPARIN SODIUM 100 MG/ML
40 INJECTION SUBCUTANEOUS DAILY
Status: DISCONTINUED | OUTPATIENT
Start: 2025-04-28 | End: 2025-04-29 | Stop reason: HOSPADM

## 2025-04-28 RX ORDER — ACETAMINOPHEN 325 MG/1
650 TABLET ORAL EVERY 6 HOURS PRN
Status: DISCONTINUED | OUTPATIENT
Start: 2025-04-28 | End: 2025-04-29 | Stop reason: HOSPADM

## 2025-04-28 RX ORDER — TROSPIUM CHLORIDE 20 MG/1
20 TABLET, FILM COATED ORAL
Status: DISCONTINUED | OUTPATIENT
Start: 2025-04-28 | End: 2025-04-28

## 2025-04-28 RX ORDER — LOSARTAN POTASSIUM 25 MG/1
25 TABLET ORAL DAILY
Status: DISCONTINUED | OUTPATIENT
Start: 2025-04-28 | End: 2025-04-29 | Stop reason: HOSPADM

## 2025-04-28 RX ORDER — POTASSIUM CHLORIDE 7.45 MG/ML
10 INJECTION INTRAVENOUS PRN
Status: DISCONTINUED | OUTPATIENT
Start: 2025-04-28 | End: 2025-04-29 | Stop reason: HOSPADM

## 2025-04-28 RX ORDER — ONDANSETRON 4 MG/1
4 TABLET, ORALLY DISINTEGRATING ORAL EVERY 8 HOURS PRN
Status: DISCONTINUED | OUTPATIENT
Start: 2025-04-28 | End: 2025-04-29 | Stop reason: HOSPADM

## 2025-04-28 RX ORDER — EZETIMIBE 10 MG/1
10 TABLET ORAL DAILY
Status: DISCONTINUED | OUTPATIENT
Start: 2025-04-28 | End: 2025-04-29 | Stop reason: HOSPADM

## 2025-04-28 RX ORDER — ONDANSETRON 2 MG/ML
4 INJECTION INTRAMUSCULAR; INTRAVENOUS EVERY 6 HOURS PRN
Status: DISCONTINUED | OUTPATIENT
Start: 2025-04-28 | End: 2025-04-29 | Stop reason: HOSPADM

## 2025-04-28 RX ORDER — SODIUM CHLORIDE 9 MG/ML
INJECTION, SOLUTION INTRAVENOUS PRN
Status: DISCONTINUED | OUTPATIENT
Start: 2025-04-28 | End: 2025-04-29 | Stop reason: HOSPADM

## 2025-04-28 RX ORDER — CLOPIDOGREL BISULFATE 75 MG/1
75 TABLET ORAL DAILY
Status: DISCONTINUED | OUTPATIENT
Start: 2025-04-28 | End: 2025-04-29 | Stop reason: HOSPADM

## 2025-04-28 RX ORDER — DILTIAZEM HYDROCHLORIDE 120 MG/1
120 CAPSULE, COATED, EXTENDED RELEASE ORAL DAILY
Status: DISCONTINUED | OUTPATIENT
Start: 2025-04-28 | End: 2025-04-29 | Stop reason: HOSPADM

## 2025-04-28 RX ORDER — MAGNESIUM SULFATE IN WATER 40 MG/ML
2000 INJECTION, SOLUTION INTRAVENOUS PRN
Status: DISCONTINUED | OUTPATIENT
Start: 2025-04-28 | End: 2025-04-29 | Stop reason: HOSPADM

## 2025-04-28 RX ORDER — SODIUM CHLORIDE 0.9 % (FLUSH) 0.9 %
5-40 SYRINGE (ML) INJECTION PRN
Status: DISCONTINUED | OUTPATIENT
Start: 2025-04-28 | End: 2025-04-29 | Stop reason: HOSPADM

## 2025-04-28 RX ORDER — TROSPIUM CHLORIDE 20 MG/1
20 TABLET, FILM COATED ORAL
Status: DISCONTINUED | OUTPATIENT
Start: 2025-04-29 | End: 2025-04-29 | Stop reason: HOSPADM

## 2025-04-28 RX ORDER — POLYETHYLENE GLYCOL 3350 17 G/17G
17 POWDER, FOR SOLUTION ORAL DAILY PRN
Status: DISCONTINUED | OUTPATIENT
Start: 2025-04-28 | End: 2025-04-29 | Stop reason: HOSPADM

## 2025-04-28 RX ADMIN — ENOXAPARIN SODIUM 40 MG: 100 INJECTION SUBCUTANEOUS at 23:40

## 2025-04-28 RX ADMIN — LOSARTAN POTASSIUM 25 MG: 25 TABLET, FILM COATED ORAL at 23:40

## 2025-04-28 RX ADMIN — EZETIMIBE 10 MG: 10 TABLET ORAL at 23:40

## 2025-04-28 RX ADMIN — SODIUM CHLORIDE, PRESERVATIVE FREE 10 ML: 5 INJECTION INTRAVENOUS at 23:46

## 2025-04-28 RX ADMIN — DILTIAZEM HYDROCHLORIDE 120 MG: 120 CAPSULE, EXTENDED RELEASE ORAL at 23:40

## 2025-04-28 RX ADMIN — CLOPIDOGREL BISULFATE 75 MG: 75 TABLET, FILM COATED ORAL at 23:40

## 2025-04-28 ASSESSMENT — PAIN SCALES - GENERAL: PAINLEVEL_OUTOF10: 0

## 2025-04-28 ASSESSMENT — LIFESTYLE VARIABLES
HOW OFTEN DO YOU HAVE A DRINK CONTAINING ALCOHOL: NEVER
HOW MANY STANDARD DRINKS CONTAINING ALCOHOL DO YOU HAVE ON A TYPICAL DAY: PATIENT DOES NOT DRINK

## 2025-04-28 ASSESSMENT — PAIN - FUNCTIONAL ASSESSMENT: PAIN_FUNCTIONAL_ASSESSMENT: NONE - DENIES PAIN
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
## 2025-04-28 NOTE — CARE COORDINATION
ED- Room # 18--Spoke with pt at bedside and her son Ric Gasca  of [place] ([phone])  who , along with her stepson Pat living in [place] are caretakers for pt . Pt lives alone with regular communication / visits from the sons . The concern was for her changing memory and behavior of pt . No firm decisions were arrived at for future plans, but suggestions were for [facility] and \"A place for [facility]\". She is pleasant and alert with some confusion. Gave pt son Ric information on a\"A place for Mom\"and that he might be contacted by a representative .Ric will be at [facility] in the morning.  Faxed  referral form to [facility] . Informed Ric that  will be following up to assist with decisions on SNF care at [facility] , where she had a favorable previous experience. Pt transported for admission upstairs.
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
## 2025-04-28 NOTE — ED TRIAGE NOTES
Patient presents with son from home with c/o altered mental status. Patient states \"I'm having a mental break bad.\" Patient adds \"I'm forgetting where I am in my own home. I've torn up rooms trying to figure it out.\" Patient answers all questions appropriately except what month it is.

## 2025-04-29 VITALS
HEIGHT: 66 IN | HEART RATE: 68 BPM | WEIGHT: 150 LBS | TEMPERATURE: 97.6 F | SYSTOLIC BLOOD PRESSURE: 96 MMHG | BODY MASS INDEX: 24.11 KG/M2 | DIASTOLIC BLOOD PRESSURE: 54 MMHG | RESPIRATION RATE: 20 BRPM | OXYGEN SATURATION: 94 %

## 2025-04-29 LAB
ANION GAP SERPL CALCULATED.3IONS-SCNC: 12 MMOL/L (ref 9–17)
BASOPHILS # BLD: 0.05 K/UL
BASOPHILS NFR BLD: 1 % (ref 0–1)
BUN SERPL-MCNC: 21 MG/DL (ref 7–20)
CALCIUM SERPL-MCNC: 9.1 MG/DL (ref 8.3–10.6)
CHLORIDE SERPL-SCNC: 105 MMOL/L (ref 99–110)
CO2 SERPL-SCNC: 21 MMOL/L (ref 21–32)
CREAT SERPL-MCNC: 0.9 MG/DL (ref 0.6–1.2)
EOSINOPHIL # BLD: 0.13 K/UL
EOSINOPHILS RELATIVE PERCENT: 2 % (ref 0–3)
ERYTHROCYTE [DISTWIDTH] IN BLOOD BY AUTOMATED COUNT: 12.7 % (ref 11.7–14.9)
GFR, ESTIMATED: 61 ML/MIN/1.73M2
GLUCOSE SERPL-MCNC: 136 MG/DL (ref 74–99)
HCT VFR BLD AUTO: 40.5 % (ref 37–47)
HGB BLD-MCNC: 13.4 G/DL (ref 12.5–16)
IMM GRANULOCYTES # BLD AUTO: 0.01 K/UL
IMM GRANULOCYTES NFR BLD: 0 %
LYMPHOCYTES NFR BLD: 2.33 K/UL
LYMPHOCYTES RELATIVE PERCENT: 40 % (ref 24–44)
MCH RBC QN AUTO: 30.5 PG (ref 27–31)
MCHC RBC AUTO-ENTMCNC: 33.1 G/DL (ref 32–36)
MCV RBC AUTO: 92.3 FL (ref 78–100)
MONOCYTES NFR BLD: 0.67 K/UL
MONOCYTES NFR BLD: 11 % (ref 0–5)
NEUTROPHILS NFR BLD: 46 % (ref 36–66)
NEUTS SEG NFR BLD: 2.68 K/UL
PLATELET # BLD AUTO: 208 K/UL (ref 140–440)
PMV BLD AUTO: 9.3 FL (ref 7.5–11.1)
POTASSIUM SERPL-SCNC: 3.9 MMOL/L (ref 3.5–5.1)
RBC # BLD AUTO: 4.39 M/UL (ref 4.2–5.4)
SODIUM SERPL-SCNC: 138 MMOL/L (ref 136–145)
WBC OTHER # BLD: 5.9 K/UL (ref 4–10.5)

## 2025-04-29 PROCEDURE — 80048 BASIC METABOLIC PNL TOTAL CA: CPT

## 2025-04-29 PROCEDURE — 94761 N-INVAS EAR/PLS OXIMETRY MLT: CPT

## 2025-04-29 PROCEDURE — 6360000002 HC RX W HCPCS: Performed by: STUDENT IN AN ORGANIZED HEALTH CARE EDUCATION/TRAINING PROGRAM

## 2025-04-29 PROCEDURE — 97166 OT EVAL MOD COMPLEX 45 MIN: CPT

## 2025-04-29 PROCEDURE — 85025 COMPLETE CBC W/AUTO DIFF WBC: CPT

## 2025-04-29 PROCEDURE — 2500000003 HC RX 250 WO HCPCS: Performed by: STUDENT IN AN ORGANIZED HEALTH CARE EDUCATION/TRAINING PROGRAM

## 2025-04-29 PROCEDURE — G0378 HOSPITAL OBSERVATION PER HR: HCPCS

## 2025-04-29 PROCEDURE — 36415 COLL VENOUS BLD VENIPUNCTURE: CPT

## 2025-04-29 PROCEDURE — 96372 THER/PROPH/DIAG INJ SC/IM: CPT

## 2025-04-29 PROCEDURE — 97530 THERAPEUTIC ACTIVITIES: CPT

## 2025-04-29 PROCEDURE — 6370000000 HC RX 637 (ALT 250 FOR IP): Performed by: STUDENT IN AN ORGANIZED HEALTH CARE EDUCATION/TRAINING PROGRAM

## 2025-04-29 RX ORDER — CALCIUM CARBONATE 500 MG/1
1000 TABLET, CHEWABLE ORAL 3 TIMES DAILY PRN
Status: DISCONTINUED | OUTPATIENT
Start: 2025-04-29 | End: 2025-04-29 | Stop reason: HOSPADM

## 2025-04-29 RX ORDER — PANTOPRAZOLE SODIUM 40 MG/1
40 TABLET, DELAYED RELEASE ORAL
Status: DISCONTINUED | OUTPATIENT
Start: 2025-04-29 | End: 2025-04-29 | Stop reason: HOSPADM

## 2025-04-29 RX ORDER — CALCIUM CARBONATE 500 MG/1
1000 TABLET, CHEWABLE ORAL 3 TIMES DAILY PRN
Qty: 60 TABLET | Refills: 0 | Status: SHIPPED | OUTPATIENT
Start: 2025-04-29 | End: 2025-05-29

## 2025-04-29 RX ADMIN — CLOPIDOGREL BISULFATE 75 MG: 75 TABLET, FILM COATED ORAL at 10:52

## 2025-04-29 RX ADMIN — ENOXAPARIN SODIUM 40 MG: 100 INJECTION SUBCUTANEOUS at 10:52

## 2025-04-29 RX ADMIN — DILTIAZEM HYDROCHLORIDE 120 MG: 120 CAPSULE, EXTENDED RELEASE ORAL at 10:57

## 2025-04-29 RX ADMIN — EZETIMIBE 10 MG: 10 TABLET ORAL at 10:52

## 2025-04-29 RX ADMIN — SODIUM CHLORIDE, PRESERVATIVE FREE 10 ML: 5 INJECTION INTRAVENOUS at 10:52

## 2025-04-29 NOTE — CARE COORDINATION
LSW spoke with pt's son and informed him of OT recs are for home with HC.  Son is agreeable and requested [facility].  Son will be working with [facility] to help with non Medical care at home.  LSW called [facility] and gave referral.  LSW faxed face sheet, H&P and OT note.      CM will need a inpt order for HC with nursing and PT/OT.  If pt is discharge after hours please call [facility] [phone] and inform them of pt on discharge and the need to start HC services.  Fax HC order and AVS to [phone].      2:36PM Pt is on discharge to go home with family support and [facility].  LSW called [facility] and informed them of pt discharge.  LSW faxed over the HC order and AVS.  HC will contact the pt and /or her son regarding the first visit.

## 2025-04-29 NOTE — PROGRESS NOTES
4 Eyes Skin Assessment     NAME:  Melodie Madden  YOB: 1950  MEDICAL RECORD NUMBER:  [id number]    The patient is being assessed for  Admission    I agree that at least one RN has performed a thorough Head to Toe Skin Assessment on the patient. ALL assessment sites listed below have been assessed.      Areas assessed by both nurses:    Head, Face, Ears, Shoulders, Back, Chest, Arms, Elbows, Hands, Sacrum. Buttock, Coccyx, Ischium, Legs. Feet and Heels, and Under Medical Devices         Does the Patient have a Wound? No noted wound(s)       [place] Prevention initiated by RN: No  Wound Care Orders initiated by RN: No    Pressure Injury (Stage 3,4, Unstageable, DTI, NWPT, and Complex wounds) if present, place Wound referral order by RN under : No    New Ostomies, if present place, Ostomy referral order under : No     Nurse 1 eSignature: Electronically signed by Jeremiah Villegas RN on 4/29/25 at 12:58 AM EDT    **SHARE this note so that the co-signing nurse can place an eSignature**    Nurse 2 eSignature: {Esignature:[id number]}
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
## 2025-04-29 NOTE — CARE COORDINATION
LSW received a consult for placement.  ED STEVEN Chong spoke with pt and family last evening.  Family is requesting [facility] [place] and was given info on [facility].  Pt did not have PT/OT orders.  LSW PS the doctor and requested orders for therapy.  WB note placed.  LSW will follow up with pt and family regarding placement.

## 2025-04-29 NOTE — PROGRESS NOTES
Occupational Therapy  [facility] ACUTE CARE OCCUPATIONAL THERAPY EVALUATION  Melodie Madden, 1950, 3010/3010-A, 4/29/2025    Discharge Recommendation: Encourage home with assist/supervision 24/7, with home health occupational therapy services to address minimal/mild post-acute rehabilitation needs. If unable to have 24/7 supervision for safety, would recommend transition to IL/AL for safety.     History  [place]:  The primary encounter diagnosis was Episodic confusion. A diagnosis of Elevated troponin was also pertinent to this visit.  Patient  has a past medical history of Cerebral artery occlusion with cerebral infarction (HCC), Diabetes mellitus (HCC), History of transesophageal echocardiography (LARY), Hyperlipidemia, and Hypertension.  Patient  has a past surgical history that includes Anterior cruciate ligament repair; Eye surgery; Appendectomy; Cholecystectomy; knee surgery; Ovary removal; and Dilation and curettage of uterus.    Subjective:  Patient comments: \"I can't even remember my husbands name right now!\".    Pain:  Denied.    Communication with other providers: Nurse okayed session.  Restrictions: General Precautions, Fall Risk     Home Setup/Prior level of function  Social/Functional History  Lives With: Alone  Type of Home: House  Home Layout: Multi-level (has a stair lift)  Home Access: Level entry (back door level, 2 steps at front)  Bathroom Shower/Tub: Tub/Shower unit  Bathroom Toilet: Standard  Home Equipment: Cane, Walker - Rolling, Rollator, Walker - Standard (does not use equipment)  Has the patient had two or more falls in the past year or any fall with injury in the past year?: No  Receives Help From: Friend(s), Neighbor, Family  Prior Level of Assist for ADLs: Independent  Prior Level of Assist for Homemaking: Independent  Homemaking Responsibilities: Yes  Prior Level of Assist for Ambulation: Independent household ambulator, with or without device (reports furniture  clothing? [] 1    [] 2   [] 2   [] 3   [x] 3   [] 4      2. Bathing (including washing, rinsing, drying)? [] 1   [] 2   [] 2 [] 3 [x] 3 [] 4   3. Toileting, which includes using toilet, bedpan, or urinal? [] 1    [] 2   [] 2   [] 3   [x] 3   [] 4     4. Putting on and taking off regular upper body clothing? [] 1   [] 2   [] 2   [] 3   [x] 3    [] 4      5. Taking care of personal grooming such as brushing teeth? [] 1   [] 2    [] 2 [] 3    [x] 3   [] 4      6. Eating meals?   [] 1   [] 2   [] 2   [] 3   [] 3   [x] 4      Raw Score:  19    [24=0% impaired(CH), 23=1-19%(CI), 20-22=20-39%(CJ), 15-19=40-59%(CK), 10-14=60-79%(CL), 7-9=80-99%(CM), 6=100%(CN)]     Mobility:  Supine to sit: SBA   Sit to stand: SBA (from EOB to RW)   Stand to sit: SBA (to reclining chair)   Functional Mobility: CGA to SBA (approx 225ft w/ RW, 5ft w/ no AD)     Balance:   Sitting balance: Good   Standing balance: Fair+     Pt educated on role of therapy, POC, safety awareness, importance of OOB activity    Treatment:  Therapeutic Activity Training:   Therapeutic activity training was instructed today.  Cues were given for safety, sequence, UE/LE placement, awareness, and balance.    Activities performed today included bed mobility training, sup-sit, sit-stand, functional mobility.     Safety: Patient left seated in reclining chair with alarm, call light within reach, RN notified, gait belt used.    Assessment:  Pt is a 74 admitted from home for cognitive and behavioral changes. Pt at baseline is independent for ADLs and for functional transfers/mobility. Pt currently presents w/ deficits relating to ADLs, IADLs, functional activity tolerance, cognition, safety awareness, and functional mobility. Pt would benefit from continued acute care OT services and upon discharge would benefit from continued services.     Complexity: Moderate   Prognosis: Good, no significant barriers to participation at this time.   Occupational Therapy Plan  Times Per

## 2025-04-30 LAB
EKG ATRIAL RATE: 85 BPM
EKG DIAGNOSIS: NORMAL
EKG P AXIS: 27 DEGREES
EKG P-R INTERVAL: 162 MS
EKG Q-T INTERVAL: 360 MS
EKG QRS DURATION: 94 MS
EKG QTC CALCULATION (BAZETT): 428 MS
EKG R AXIS: 16 DEGREES
EKG T AXIS: 28 DEGREES
EKG VENTRICULAR RATE: 85 BPM

## 2025-04-30 PROCEDURE — 93010 ELECTROCARDIOGRAM REPORT: CPT | Performed by: INTERNAL MEDICINE

## 2025-05-05 NOTE — PROGRESS NOTES
Physician Progress Note      PATIENT:               NAVARRO AGUILAR  [facility] #:                  [id number]  :                       1950  ADMIT DATE:       2025 1:40 PM  DISCH DATE:        2025 3:29 PM  RESPONDING  PROVIDER #:        Gaston Livingston MD          QUERY TEXT:    Based on your medical judgment, please clarify these findings and document if   any of the following are being evaluated and/or treated:    The clinical indicators include:  \"A patient 74 y.o. female with pmhx of hypertension, paroxysmal A-fib,   hypertension on Eliquis\" (ED )  PMH: HTN, HLD  Age (74)  Treatment: Apixaban  Options provided:  -- Secondary hypercoagulable state in a patient with atrial fibrillation  -- Other - I will add my own diagnosis  -- Disagree - Not applicable / Not valid  -- Disagree - Clinically unable to determine / Unknown  -- Refer to Clinical Documentation Reviewer    PROVIDER RESPONSE TEXT:    This patient has secondary hypercoagulable state in a patient with atrial   fibrillation.    Query created by: [place] on 2025 2:06 PM      Electronically signed by:  Gaston Livingston MD 2025 5:24 PM

## 2025-05-07 NOTE — PROGRESS NOTES
MRN: [id number]  Name: Melodie Madden  : 1950    Insurance: Payor: [facility] /  /  /      Phone #: [phone]  Provider: Olimpia Yeung MD     Date of Visit: 2025    Reason for visit: 1 mo w/results echo/carotid  Recent Hospitalization Date:    Reason for Hospitalization:    Last EK2025  Type of Device:       Vitals BP HR O2% WT HT ORTHO BP LYING ORTHO BP SITTING ORTHO BP SITTING   Today's Findings           Patients work up- Check List     Testing Last Date Completed Date Expected  ([facility]) Additional Notes    MA to document For provider to complete Either MA or Provider    Carotid Duplex 2025 STAT 1 WK 6 MTH       THIS WK 2 WK 1 YEAR     Cardiac CTA  STAT 1 WK 6 MTH       THIS WK 2 WK 1 YEAR     Cardiac CT Calcium scoring  STAT 1 WK 6 MTH       THIS WK 2 WK 1 YEAR     CTA Chest, Abdomen & Pelvis  STAT 1 WK 6 MTH       THIS WK 2 WK 1 YEAR     CT Chest IV w/ Contrast  STAT 1 WK 6 MTH       THIS WK 2 WK 1 YEAR     CT Chest w/o Contrast  STAT 1 WK 6 MTH       THIS WK 2 WK 1 YEAR     CXR 2025 STAT 1 WK 6 MTH       THIS WK 2 WK 1 YEAR     ECHO 2025 Stress Complete Limited     MRI- Cardiac  STAT 1 WK 6 MTH       THIS WK 2 WK 1 YEAR     MUGA Scan  STAT 1 WK 6 MTH       THIS WK 2 WK 1 YEAR     Nuclear Stress  Lexiscan Cardiolite     PFT  STAT 1 WK 6 MTH       THIS WK 2 WK 1 YEAR     Treadmill Stress Test  STAT 1 WK 6 MTH       THIS WK 2 WK 1 YEAR     Vascular Duplex  Lower: Right Left Bilat       Upper: Right Left Bilat     Other Test Not Listed:    Monitors Last Date Completed Day's Request/Ordered     Holter 3/2025 Short term 24 hours 48 hours      Long term 3 days 7 days 14 days   Event  3/2023 (1-30 days)      Procedures Last Date Performed Procedure Details Date Expected   Additional Notes    ASD Closure        Carotid Angio        Cardioversion        Heart Cath  R L R&L      Peripheral Angio  R L      PFO Closure        PTCA/PCI        LARY        LARY/Cardioversion

## 2025-05-12 ENCOUNTER — OFFICE VISIT (OUTPATIENT)
Dept: CARDIOLOGY CLINIC | Age: 75
End: 2025-05-12
Payer: COMMERCIAL

## 2025-05-12 VITALS
HEIGHT: 66 IN | WEIGHT: 147.4 LBS | BODY MASS INDEX: 23.69 KG/M2 | DIASTOLIC BLOOD PRESSURE: 60 MMHG | HEART RATE: 70 BPM | SYSTOLIC BLOOD PRESSURE: 122 MMHG

## 2025-05-12 DIAGNOSIS — I48.0 PAF (PAROXYSMAL ATRIAL FIBRILLATION) (HCC): Primary | ICD-10-CM

## 2025-05-12 PROCEDURE — 1159F MED LIST DOCD IN RCRD: CPT | Performed by: INTERNAL MEDICINE

## 2025-05-12 PROCEDURE — 3078F DIAST BP <80 MM HG: CPT | Performed by: INTERNAL MEDICINE

## 2025-05-12 PROCEDURE — 1123F ACP DISCUSS/DSCN MKR DOCD: CPT | Performed by: INTERNAL MEDICINE

## 2025-05-12 PROCEDURE — 99214 OFFICE O/P EST MOD 30 MIN: CPT | Performed by: INTERNAL MEDICINE

## 2025-05-12 PROCEDURE — 3074F SYST BP LT 130 MM HG: CPT | Performed by: INTERNAL MEDICINE

## 2025-05-12 NOTE — PROGRESS NOTES
CLINICAL STAFF DOCUMENTATION    Dr. Vicki Madden  1950  [id number]    Have you had any Chest Pain recently? - No  Have you had any Shortness of Breath - Yes  When did it begin? - Months   If Yes - When on exertion  Have you had any dizziness - No  Have you had any palpitations recently? - No  Do you have any edema - swelling in No    Do you need any prescriptions refilled? - No    Do you have a surgery or procedure scheduled in the near future - No  I  Do use tobacco products? - No  Do you drink alcohol? - No  Do you use any illicit drugs? - No  Caffeine? - Yes  How much caffeine? .1  cups       Check medication list thoroughly!!! AND RECONCILE OUTSIDE MEDICATIONS  If dose has changed change the entire order not just the MG  BE SURE TO ASK PATIENT IF THEY NEED MEDICATION REFILLS  Verify Pharmacy and update if incorrect    Add to every patient's \"wrap up\" the following dot phrase AFTERVISITCARDIOHEARTHOUSE and ensure we explain this to our patients   
exam,  Neck - Supple.  No jugular venous distention noted. No carotid bruits, no apical -carotid delay  Respiratory:  Normal breath sounds, No respiratory distress, No wheezing, No chest tenderness.,no use of accessory muscles, diaphragm movement is normal  Cardiovascular: (PMI) apex non displaced,no lifts no thrills, no s3,no s4, Normal heart rate, Normal rhythm, No murmurs, No rubs, No gallops. Carotid arteries pulse and amplitude are normal no bruit, no abdominal bruit noted ( normal abdominal aorta ausculation),   Extremities - No cyanosis, clubbing, or significant edema, no varicose veins    Abdomen - No masses, tenderness, or organomegaly, no hepato-splenomegally, no bruits  Musculoskeletal - No significant edema, no kyphosis or scoliosis, no deformity in any extremity noted, muscle strength and tone are normal  Skin: no ulcer,no scar,no stasis dermatitis   Neurologic - alert oriented times 3,Cranial nerves II through XII are grossly intact.  There were no gross focal neurologic abnormalities.   Psychiatric: normal mood and affect    Lab Results   Component Value Date/Time    CKTOTAL 363 04/28/2025 02:50 PM     BNP:  No results found for: \"BNP\"  PT/INR:  No results found for: \"PTINR\"  Lab Results   Component Value Date    LABA1C 6.8 (H) 01/22/2023    LABA1C 7.1 (H) 04/11/2021     Lab Results   Component Value Date    CHOL 173 08/28/2024    TRIG 261 (H) 08/28/2024    HDL 44 08/28/2024    LDLDIRECT 108 (H) 04/11/2021     Lab Results   Component Value Date    ALT 29 04/28/2025    AST 35 04/28/2025     TSH:    Lab Results   Component Value Date/Time    TSH 0.38 04/28/2025 02:50 PM       Impression:  Melodie is a 74 y.o.year old who  has a past medical history of Cerebral artery occlusion with cerebral infarction (HCC), Diabetes mellitus (HCC), History of transesophageal echocardiography (LARY), Hyperlipidemia, and Hypertension. and presents with     Plan:  Dizziness with standing: could be orthostatic hypotension as

## 2025-06-02 ENCOUNTER — TELEPHONE (OUTPATIENT)
Dept: CARDIOLOGY CLINIC | Age: 75
End: 2025-06-02

## 2025-06-02 NOTE — TELEPHONE ENCOUNTER
BRENNAN for Patient requesting call back to schedule office visit with Dr. Joy to discuss possible Watchman.    Will try again later to contact Patient.

## 2025-06-06 ENCOUNTER — TELEPHONE (OUTPATIENT)
Dept: CARDIOLOGY CLINIC | Age: 75
End: 2025-06-06

## 2025-06-27 RX ORDER — CLOPIDOGREL BISULFATE 75 MG/1
75 TABLET ORAL DAILY
Qty: 30 TABLET | Refills: 0 | Status: SHIPPED | OUTPATIENT
Start: 2025-06-27